# Patient Record
Sex: FEMALE | Race: WHITE | Employment: FULL TIME | URBAN - METROPOLITAN AREA
[De-identification: names, ages, dates, MRNs, and addresses within clinical notes are randomized per-mention and may not be internally consistent; named-entity substitution may affect disease eponyms.]

---

## 2018-03-06 ENCOUNTER — OFFICE VISIT (OUTPATIENT)
Dept: SURGERY | Facility: CLINIC | Age: 38
End: 2018-03-06
Payer: COMMERCIAL

## 2018-03-06 ENCOUNTER — HOSPITAL ENCOUNTER (OUTPATIENT)
Dept: MAMMOGRAPHY | Facility: CLINIC | Age: 38
Discharge: HOME | End: 2018-03-06
Attending: SURGERY | Admitting: SURGERY
Payer: COMMERCIAL

## 2018-03-06 VITALS
DIASTOLIC BLOOD PRESSURE: 72 MMHG | BODY MASS INDEX: 21.26 KG/M2 | HEART RATE: 75 BPM | SYSTOLIC BLOOD PRESSURE: 119 MMHG | HEIGHT: 63 IN | WEIGHT: 120 LBS

## 2018-03-06 DIAGNOSIS — N63.10 BREAST MASS, RIGHT: Primary | ICD-10-CM

## 2018-03-06 DIAGNOSIS — N63.10 BREAST MASS, RIGHT: ICD-10-CM

## 2018-03-06 PROCEDURE — 99205 OFFICE O/P NEW HI 60 MIN: CPT | Performed by: SURGERY

## 2018-03-06 PROCEDURE — 76642 ULTRASOUND BREAST LIMITED: CPT | Mod: RT

## 2018-03-06 NOTE — MR AVS SNAPSHOT
"              After Visit Summary   3/6/2018    Mitra Chan    MRN: [id number]           Patient Information     Date Of Birth          1980        Visit Information        Provider Department      3/6/2018 1:45 PM Farhana Hanna MD [facility]      Today's Diagnoses     Breast mass, right    -  1       Follow-ups after your visit        Future tests that were ordered for you today     Open Future Orders        Priority Expected Expires Ordered    US Breast Right Limited 1-3 Quadrants Routine 9/6/2018 3/6/2019 3/6/2018    US Breast Right Limited 1-3 Quadrants Routine 3/6/2018 3/6/2019 3/6/2018            Who to contact     If you have questions or need follow up information about today's clinic visit or your schedule please contact [facility] directly at [phone].  Normal or non-critical lab and imaging results will be communicated to you by MyChart, letter or phone within 4 business days after the clinic has received the results. If you do not hear from us within 7 days, please contact the clinic through [employer] or phone. If you have a critical or abnormal lab result, we will notify you by phone as soon as possible.  Submit refill requests through [employer] or call your pharmacy and they will forward the refill request to us. Please allow 3 business days for your refill to be completed.          Additional Information About Your Visit        MyChart Information     [employer] lets you send messages to your doctor, view your test results, renew your prescriptions, schedule appointments and more. To sign up, go to [email or web address] . Click on \"Log in\" on the left side of the screen, which will take you to the Welcome page. Then click on \"Sign up Now\" on the right side of the page.     You will be asked to enter the access code listed below, as well as some personal information. Please follow the " "directions to create your username and password.     Your access code is: [email or web address]  Expires: 2018  3:18 PM     Your access code will  in 90 days. If you need help or a new code, please call your [facility] clinic or [phone].        Care EveryWhere ID     This is your Care EveryWhere ID. This could be used by other organizations to access your [facility] medical records  [id number]        Your Vitals Were     Pulse Height BMI (Body Mass Index)             75 5' 3\" (1.6 m) 21.26 kg/m2          Blood Pressure from Last 3 Encounters:   18 119/72    Weight from Last 3 Encounters:   18 120 lb (54.4 kg)               Primary Care Provider Office Phone # Fax #    Klaus Reynolds [phone]       [id number]  [facility] [address]        Equal Access to Services     KISHA CASTANO AH: Hadii aad ku hadasho Soomaali, waaxda luqadaha, qaybta kaalmada adeegyada, waxay idiin hayaan ellieg josep fontenotn . So [facility] [phone].    ATENCIÓN: Si habla español, tiene a dennis disposición servicios gratuitos de asistencia lingüística. Llame al [phone].    We comply with applicable federal civil rights laws and [place] laws. We do not discriminate on the basis of race, color, national origin, age, disability, sex, sexual orientation, or gender identity.            Thank you!     Thank you for choosing [facility]  for your care. Our goal is always to provide you with excellent care. Hearing back from our patients is one way we can continue to improve our services. Please take a few minutes to complete the written survey that you may receive in the mail after your visit with us. Thank you!             Your Updated Medication List - Protect others around you: Learn how to safely use, store and throw away your medicines at www.disposemymeds.org.      Notice  As of 3/6/2018  3:18 PM    You have not been prescribed any medications.      "

## 2018-03-06 NOTE — PROGRESS NOTES
[facility] Breast Surgery Consultation    HPI:   Mitra Chan is a 37 year old female who is seen in consultation at the request of Dr. Reynolds for evaluation of evaluation of a lump in her right breast.     She was previously seen by Dr. Travis and has a history of excision of two left breast fibroadenomas in 2011. She also had excision of two fibroadenomas when she was 21yo from the left breast per chart review.     She reports she has felt a lump in her right breast for the past month. It is tender with palpation/pressure. She denies any other breast changes. No nipple drainage. No new masses on the left.  She has undergone 1 cycle of infertility treatment with estrogen use a few months ago.  She is scheduled for a another cycle of infertility treatment to be unlikely at the end of the month.  She currently is not on any oral contraceptives or hormone medications.  She reports she has had nipple inversion on the right breast since she was a teenager.    Her family history is notable for a paternal grandmother w colon cancer, lung cancer in maternal grandmother, prostate cancer in paternal grandfather.  Her mother was diagnosed with ovarian cancer 6 years ago, she had genetic testing and was negative for BRCA 1 and 2 per pt.     She reached menarche at 15 years old.  Her last menstrual period was began on March 1.  She has no children.  She has used oral contraceptives in the past.      Imaging:   none    Percutaneous core needle biopsy: none      Past Medical History:  Left breast fibroadenomas        Past Surgical History:  Excision of left breast fibroadenomas     Allergies: Compazine and sulfa      Social History:  Social History     Social History     Marital status:      Spouse name: N/A     Number of children: N/A     Years of education: N/A     Occupational History     Not on file.     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    ROS:  The 10 point review of systems is negative other than noted in the HPI and above.    PE:  Vitals: There were no vitals taken for this visit.  General appearance: well-nourished, sitting comfortably, no apparent distress  Psych: normal affect, pleasant  HEENT:  Head normocephalic and atraumatic, pupils equal and round, conjunctivae clear, mucous membranes moist, external ears and nose normal  Neck: Supple without thyromegaly or masses  Lungs: Respirations unlabored  Lymphatic: No cervical, or supraclavicular lymphadenopathy  Extremities: Without edema  Musculoskeletal:  Normal station and gait  Neurologic: nonfocal, grossly intact times four extremities, alert and oriented times three  Psychiatric: Mood and affect are appropriate  Skin: Without lesions or rashes    Breast:  A bilateral breast exam was performed in the supine position.. Bilateral breasts were palpated in a circumferential clockwise fashion including the supraclavicular and axillary areas.   Bilateral breasts are small in size.  There is a 1 cm palpable lesion at 8:00 2-3 cm from the nipple on the right breast which is mobile and clinically feels consistent with fibroadenoma.  She is tender to palpation over this lesion.  Remainder of the breast tissue is moderately dense.  She does have some mild nipple inversion on the right breast.  Left breast with periareolar incision which is well-healed at 12:00.  No palpable masses.  Parenchyma is moderately dense throughout.  Nipple and areolar complex are normal.    Lymph:       No supraclavicular/infraclavicular adenopathy.   Axillary adenopathy: none    Assessment: Mitra is a very pleasant 37-year-old female with a past medical history significant for ulcerative colitis status post total abdominal colectomy  who presents with a palpable right breast mass.  I recommend ultrasound for further evaluation and this will be completed today.  I will discuss plan going forward following her ultrasound.  She is leaning towards excision of the mass as she will be starting hormone replacement and is concerned about if she has issues with pain if she were to become pregnant and need excision at that time.     Plan:  Her US reveals a likely fibroadenoma with no concerning findings. Dr. Yao recommends 6month follow up if she does not proceed with excision. I discussed options with her regarding excision, including the risks, benefits, indications and alternatives vs follow up in 6 months. She is undecided regarding excision vs follow up. She would like to think about it and will let us know. I entered orders for a 6month f/u US and appt with me to follow - we will cancel this if she elects to proceed with excision. This is an easily palpable mass and would not require seed localization.         Farhana Hanna MD      Please route or send letter to:  Primary Care Provider (PCP) and Referring Provider

## 2018-03-06 NOTE — NURSING NOTE
Breast Patients    BREAST PATIENTS (ALL)    1-Do you have any of the following symptoms? Lump(s) or Mass(es)  2-In which breast are you having the symptoms? right  3-Do you use hormones?  Yes  Type: Estrogen    4-Have you had a Mammogram? No  5-Have you ever had a breast cyst drained? No  6-Have you ever had a breast biopsy? Yes  Side: Left  Date: 2000,2001,2011  7-Have you ever had a Breast Cancer? No   8-Is there a history of Breast Cancer in your family? No  9-Have you ever had Ovarian Cancer? No  10-Is there a history of Ovarian Cancer in your family? Yes   Relationship to you:    Mother  11-Summarize your caffeine intake (i.e. coffee, tea, chocolate, soda etc.): 2 per week    BREAST PATIENTS (FEMALE)    12-What age did your periods begin? 15  13-Date your last menstrual period began? 3/1/2018  14-Number of full-term pregnancies: 0  15-Your age when your first child was born? N/A  16-Did you nurse your children? N/A  17-Are you pregnant now? No  18-Have you begun menopause? No  19-Have you had either ovary removed?No  20-Do you have breast implants? No

## 2018-03-06 NOTE — LETTER
2018    Re: Mitra Chan - 1980    Mitra Chan is a 37 year old female who is seen in consultation at the request of Dr. Reynolds for evaluation of evaluation of a lump in her right breast.      She was previously seen by Dr. Travis and has a history of excision of two left breast fibroadenomas in . She also had excision of two fibroadenomas when she was 19yo from the left breast per chart review.      She reports she has felt a lump in her right breast for the past month. It is tender with palpation/pressure. She denies any other breast changes. No nipple drainage. No new masses on the left.  She has undergone 1 cycle of infertility treatment with estrogen use a few months ago.  She is scheduled for a another cycle of infertility treatment to be unlikely at the end of the month.  She currently is not on any oral contraceptives or hormone medications.  She reports she has had nipple inversion on the right breast since she was a teenager.     Her family history is notable for a paternal grandmother w colon cancer, lung cancer in maternal grandmother, prostate cancer in paternal grandfather.  Her mother was diagnosed with ovarian cancer 6 years ago, she had genetic testing and was negative for BRCA 1 and 2 per pt.      She reached menarche at 15 years old.  Her last menstrual period was began on .  She has no children. She has used oral contraceptives in the past.     Imaging:   none     Percutaneous core needle biopsy: none     Past Medical History:  Left breast fibroadenomas      Past Surgical History:  Excision of left breast fibroadenomas                     Allergies: Compazine and sulfa  ROS:  The 10 point review of systems is negative other than noted in the HPI and above.     PE:  Vitals: There were no vitals taken for this visit.  General appearance: well-nourished, sitting comfortably, no apparent distress  Psych: normal affect, pleasant  HEENT:  Head normocephalic and atraumatic,  pupils equal and round, conjunctivae clear, mucous membranes moist, external ears and nose normal  Neck: Supple without thyromegaly or masses  Lungs: Respirations unlabored  Lymphatic: No cervical, or supraclavicular lymphadenopathy  Extremities: Without edema  Musculoskeletal:  Normal station and gait  Neurologic: nonfocal, grossly intact times four extremities, alert and oriented times three  Psychiatric: Mood and affect are appropriate  Skin: Without lesions or rashes     Breast:  A bilateral breast exam was performed in the supine position.. Bilateral breasts were palpated in a circumferential clockwise fashion including the supraclavicular and axillary areas.   Bilateral breasts are small in size.  There is a 1 cm palpable lesion at 8:00 2-3 cm from the nipple on the right breast which is mobile and clinically feels consistent with fibroadenoma.  She is tender to palpation over this lesion.  Remainder of the breast tissue is moderately dense.  She does have some mild nipple inversion on the right breast.  Left breast with periareolar incision which is well-healed at 12:00.  No palpable masses.  Parenchyma is moderately dense throughout.  Nipple and areolar complex are normal.     Lymph:    No supraclavicular/infraclavicular adenopathy.                   Axillary adenopathy: none     Assessment: Mitra is a very pleasant 37-year-old female with a past medical history significant for ulcerative colitis status post total abdominal colectomy who presents with a palpable right breast mass.  I recommend ultrasound for further evaluation and this will be completed today.  I will discuss plan going forward following her ultrasound.  She is leaning towards excision of the mass as she will be starting hormone replacement and is concerned about if she has issues with pain if she were to become pregnant and need excision at that time.      Plan:  Her US reveals a likely fibroadenoma with no concerning findings. Dr. Yao  recommends 6month follow up if she does not proceed with excision. I discussed options with her regarding excision, including the risks, benefits, indications and alternatives vs follow up in 6 months. She is undecided regarding excision vs follow up. She would like to think about it and will let us know. I entered orders for a 6month f/u US and appt with me to follow - we will cancel this if she elects to proceed with excision. This is an easily palpable mass and would not require seed localization.        Farhana Hanna MD

## 2018-09-13 ENCOUNTER — OFFICE VISIT (OUTPATIENT)
Dept: SURGERY | Facility: CLINIC | Age: 38
End: 2018-09-13
Payer: COMMERCIAL

## 2018-09-13 ENCOUNTER — RADIANT APPOINTMENT (OUTPATIENT)
Dept: ULTRASOUND IMAGING | Facility: CLINIC | Age: 38
End: 2018-09-13
Attending: SURGERY
Payer: COMMERCIAL

## 2018-09-13 VITALS — DIASTOLIC BLOOD PRESSURE: 66 MMHG | SYSTOLIC BLOOD PRESSURE: 106 MMHG | HEART RATE: 68 BPM

## 2018-09-13 DIAGNOSIS — N63.10 BREAST MASS, RIGHT: Primary | ICD-10-CM

## 2018-09-13 DIAGNOSIS — N63.10 BREAST MASS, RIGHT: ICD-10-CM

## 2018-09-13 PROCEDURE — 99213 OFFICE O/P EST LOW 20 MIN: CPT | Performed by: SURGERY

## 2018-09-13 PROCEDURE — 76642 ULTRASOUND BREAST LIMITED: CPT | Mod: RT

## 2018-09-13 RX ORDER — PRENATAL VIT/IRON FUM/FOLIC AC 27MG-0.8MG
1 TABLET ORAL DAILY
COMMUNITY

## 2018-09-13 NOTE — PROGRESS NOTES
[facility] Follow Up Note    CHIEF COMPLAINT:  Right breast mass    HISTORY OF PRESENT ILLNESS:  Mitra Chan is a 38 year old female who is seen in follow up given her history of a right breast mass which I had seen her for in March 2018. She had an ultrasound at that time which revealed a likely fibroadenoma. We discussed options of 6 month follow up vs excisional biopsy and she elected to proceed with follow up. Today she reports she had her US this morning and the fibroadenoma is the same size at 1cm. She is continues to do IVF and is currently off estrogen. She had an embryo implanted recently that was unsuccessful. She has bilateral breast tenderness related to IVF. No nipple drainage or discharge. 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 Travis excised in 2011.     Review of FH and breast history:   Her family history is notable for a paternal grandmother w colon cancer, lung cancer in maternal grandmother, prostate cancer in paternal grandfather.  Her mother was diagnosed with ovarian cancer 6 years ago, she had genetic testing and was negative for BRCA 1 and 2 per pt.      She reached menarche at 15 years old.  Her last menstrual period was began on March 1.  She has no children.  She has used oral contraceptives in the past.    REVIEW OF SYSTEMS:  Constitutional:  Negative for chills, fatigue, fever and weight change.  Eyes:  Negative for blurred vision, eye pain and photophobia.  ENT:  Negative for hearing problems, ENT pain, congestion, rhinorrhea, epistaxis, hoarseness and dental problems.  Cardiovascular:  Negative for chest pain, palpitations, tachycardia, orthopnea and edema.  Respiratory:  Negative for cough, dyspnea and hemoptysis.  Gastrointestinal:  Negative for abdominal pain, heartburn, constipation, diarrhea and stool changes.  Musculoskeletal:  Negative for arthralgias, back pain and myalgias.  Integumentary/Breast:  See HPI.    PMH: h/o Ulcerative colitis s/p total  abdominal colectomy    Past Surgical History:   Procedure Laterality Date     ABDOMEN SURGERY  2015    three times     BREAST SURGERY  2000,2001,2011       Family History   Problem Relation Age of Onset     Other Cancer Mother      Colon Cancer Paternal Grandmother        Social History   Substance Use Topics     Smoking status: Former Smoker     Quit date: 3/6/2006     Smokeless tobacco: Never Used     Alcohol use Yes      Comment: 4-5 per week       Allergies   Allergen Reactions     Compazine [Prochlorperazine]      Sulfa Drugs          EXAM:  GENERAL: healthy, alert and no distress   BREAST:  The breasts appear symmetric with no overlying skin changes.  The nipples are normal bilaterally.  There is no dimpling or thickening of the skin.  There is a palpable mass on the right breast which feels to be 1 cm in size at 8:00, 4 cm FN.   The breast tissue is generally dense.  There is no axillary or supraclavicular lymphadenopathy.  CARDIOVASCULAR:  RRR  RESPIRATORY: nonlabored breathing  NECK: Neck supple. No adenopathy. Thyroid symmetric, normal size,, Carotids without bruits.  SKIN: No suspicious lesions or rashes  LYMPH: Normal cervical lymph nodes      ASSESSMENT:  Mitra Chan is a 38yof with a right breast mass which is likely a fibroadenoma based on imaging. We again discussed options of surgical excision vs follow up ultrasound in 6 months and continuing to follow the mass. At this time she would like to proceed with f/u US in March 2019 with appt with me to follow. If the mass is changing or more painful - she will call to schedule excision.     PLAN:  F/u right breast US in 6 months and clinical exam after imaging.     Farhana Hanna MD  [facility]  [phone]        Please route or send letter to:  Primary Care Provider (PCP) and Referring Provider

## 2018-09-13 NOTE — NURSING NOTE
Mitra Chan's goals for this visit include: 6 month recheck lump right breast   She requests these members of her care team be copied on today's visit information: no    PCP: Klaus Reynolds    Referring Provider:  Klaus Reynolds  [address]  [id number]  [place] [address]    There were no vitals taken for this visit.    Do you need any medication refills at today's visit? No    Zully Stallworth LPN

## 2018-09-13 NOTE — LETTER
2018    Re: Mitra Chan - 1980    CHIEF COMPLAINT:  Right breast mass     HISTORY OF PRESENT ILLNESS:  Mitra Chan is a 38 year old female who is seen in follow up given her history of a right breast mass which I had seen her for in 2018. She had an ultrasound at that time which revealed a likely fibroadenoma. We discussed options of 6 month follow up vs excisional biopsy and she elected to proceed with follow up. Today she reports she had her US this morning and the fibroadenoma is the same size at 1cm. She is continues to do IVF and is currently off estrogen. She had an embryo implanted recently that was unsuccessful. She has bilateral breast tenderness related to IVF. No nipple drainage or discharge. 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 Travis excised in .      Review of FH and breast history:   Her family history is notable for a paternal grandmother w colon cancer, lung cancer in maternal grandmother, prostate cancer in paternal grandfather.  Her mother was diagnosed with ovarian cancer 6 years ago, she had genetic testing and was negative for BRCA 1 and 2 per pt.       She reached menarche at 15 years old.  Her last menstrual period was began on .  She has no children. She has used oral contraceptives in the past.     REVIEW OF SYSTEMS:  Constitutional:  Negative for chills, fatigue, fever and weight change.  Eyes:  Negative for blurred vision, eye pain and photophobia.  ENT:  Negative for hearing problems, ENT pain, congestion, rhinorrhea, epistaxis, hoarseness and dental problems.  Cardiovascular:  Negative for chest pain, palpitations, tachycardia, orthopnea and edema.  Respiratory:  Negative for cough, dyspnea and hemoptysis.  Gastrointestinal:  Negative for abdominal pain, heartburn, constipation, diarrhea and stool changes.  Musculoskeletal:  Negative for arthralgias, back pain and myalgias.  Integumentary/Breast:  See HPI.     PMH: h/o Ulcerative  colitis s/p total abdominal colectomy      EXAM:  GENERAL: healthy, alert and no distress   BREAST:  The breasts appear symmetric with no overlying skin changes.  The nipples are normal bilaterally. There is no dimpling or thickening of the skin.  There is a palpable mass on the right breast which feels to be 1 cm in size at 8:00, 4 cm FN.   The breast tissue is generally dense.  There is no axillary or supraclavicular lymphadenopathy.  CARDIOVASCULAR:  RRR  RESPIRATORY: nonlabored breathing  NECK: Neck supple. No adenopathy. Thyroid symmetric, normal size,, Carotids without bruits.  SKIN: No suspicious lesions or rashes  LYMPH: Normal cervical lymph nodes      ASSESSMENT:  Mitra Chan is a 38yof with a right breast mass which is likely a fibroadenoma based on imaging. We again discussed options of surgical excision vs follow up ultrasound in 6 months and continuing to follow the mass. At this time she would like to proceed with f/u US in March 2019 with appt with me to follow. If the mass is changing or more painful - she will call to schedule excision.      PLAN:  F/u right breast US in 6 months and clinical exam after imaging.      Farhana Hanna MD  [facility]  [phone]

## 2018-09-13 NOTE — MR AVS SNAPSHOT
After Visit Summary   2018    Mitra Chan    MRN: [id number]           Patient Information     Date Of Birth          1980        Visit Information        Provider Department      2018 10:15 AM Farhana Hanna MD [facility]        Today's Diagnoses     Breast mass, right    -  1       Follow-ups after your visit        Future tests that were ordered for you today     Open Future Orders        Priority Expected Expires Ordered    US Breast Right Limited 1-3 Quadrants Routine 3/4/2019 2019 2018            Who to contact     If you have questions or need follow up information about today's clinic visit or your schedule please contact [facility] directly at [phone].  Normal or non-critical lab and imaging results will be communicated to you by MyChart, letter or phone within 4 business days after the clinic has received the results. If you do not hear from us within 7 days, please contact the clinic through MyChart or phone. If you have a critical or abnormal lab result, we will notify you by phone as soon as possible.  Submit refill requests through bMenu or call your pharmacy and they will forward the refill request to us. Please allow 3 business days for your refill to be completed.          Additional Information About Your Visit        MyChart Information     bMenu is an electronic gateway that provides easy, online access to your medical records. With bMenu, you can request a clinic appointment, read your test results, renew a prescription or communicate with your care team.     To sign up for bMenu visit the website at [email or web address]   You will be asked to enter the access code listed below, as well as some personal information. Please follow the directions to create your username and password.     Your access code is: [email or web address]  Expires: 2018 10:23 AM     Your access code will  in 90 days. If you  need help or a new code, please contact your [facility] or call [phone] for assistance.        Care EveryWhere ID     This is your Care EveryWhere ID. This could be used by other organizations to access your [place] medical records  [id number]        Your Vitals Were     Pulse                   68            Blood Pressure from Last 3 Encounters:   09/13/18 106/66   03/06/18 119/72    Weight from Last 3 Encounters:   03/06/18 120 lb (54.4 kg)               Primary Care Provider Office Phone # Fax #    Klaus [place] [phone]       [facility] [id number]  [facility] [address]        Equal Access to Services     KISHA CASTANO : Hadii rohan senior hadasho Soomaali, waaxda luqadaha, qaybta kaalmada adeegyada, bakari tierney. So [facility] [phone].    ATENCIÓN: Si habla español, tiene a dennis disposición servicios gratuitos de asistencia lingüística. Llame al [phone].    We comply with applicable federal civil rights laws and [place] laws. We do not discriminate on the basis of race, color, national origin, age, disability, sex, sexual orientation, or gender identity.            Thank you!     Thank you for choosing [facility]  for your care. Our goal is always to provide you with excellent care. Hearing back from our patients is one way we can continue to improve our services. Please take a few minutes to complete the written survey that you may receive in the mail after your visit with us. Thank you!             Your Updated Medication List - Protect others around you: Learn how to safely use, store and throw away your medicines at www.disposemymeds.org.          This list is accurate as of 9/13/18 10:23 AM.  Always use your most recent med list.                   Brand Name Dispense Instructions for use Diagnosis    prenatal multivitamin plus iron 27-0.8 MG Tabs per tablet      Take 1 tablet by  mouth daily

## 2018-09-13 NOTE — LETTER
9/13/2018         RE: Mitra Chan  [address] [address]        Dear Colleague,    Thank you for referring your patient, Mitra Chan, to the [facility]. Please see a copy of my visit note below.    [facility] Follow Up Note    CHIEF COMPLAINT:  Right breast mass    HISTORY OF PRESENT ILLNESS:  Mitra Chan is a 38 year old female who is seen in follow up given her history of a right breast mass which I had seen her for in March 2018. She had an ultrasound at that time which revealed a likely fibroadenoma. We discussed options of 6 month follow up vs excisional biopsy and she elected to proceed with follow up. Today she reports she had her US this morning and the fibroadenoma is the same size at 1cm. She is continues to do IVF and is currently off estrogen. She had an embryo implanted recently that was unsuccessful. She has bilateral breast tenderness related to IVF. No nipple drainage or discharge. 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 Travis excised in 2011.     Review of FH and breast history:   Her family history is notable for a paternal grandmother w colon cancer, lung cancer in maternal grandmother, prostate cancer in paternal grandfather.  Her mother was diagnosed with ovarian cancer 6 years ago, she had genetic testing and was negative for BRCA 1 and 2 per pt.      She reached menarche at 15 years old.  Her last menstrual period was began on March 1.  She has no children.  She has used oral contraceptives in the past.    REVIEW OF SYSTEMS:  Constitutional:  Negative for chills, fatigue, fever and weight change.  Eyes:  Negative for blurred vision, eye pain and photophobia.  ENT:  Negative for hearing problems, ENT pain, congestion, rhinorrhea, epistaxis, hoarseness and dental problems.  Cardiovascular:  Negative for chest pain, palpitations, tachycardia, orthopnea and edema.  Respiratory:  Negative for cough, dyspnea and  hemoptysis.  Gastrointestinal:  Negative for abdominal pain, heartburn, constipation, diarrhea and stool changes.  Musculoskeletal:  Negative for arthralgias, back pain and myalgias.  Integumentary/Breast:  See HPI.    PMH: h/o Ulcerative colitis s/p total abdominal colectomy    Past Surgical History:   Procedure Laterality Date     ABDOMEN SURGERY  2015    three times     BREAST SURGERY  2000,2001,2011       Family History   Problem Relation Age of Onset     Other Cancer Mother      Colon Cancer Paternal Grandmother        Social History   Substance Use Topics     Smoking status: Former Smoker     Quit date: 3/6/2006     Smokeless tobacco: Never Used     Alcohol use Yes      Comment: 4-5 per week       Allergies   Allergen Reactions     Compazine [Prochlorperazine]      Sulfa Drugs          EXAM:  GENERAL: healthy, alert and no distress   BREAST:  The breasts appear symmetric with no overlying skin changes.  The nipples are normal bilaterally.  There is no dimpling or thickening of the skin.  There is a palpable mass on the right breast which feels to be 1 cm in size at 8:00, 4 cm FN.   The breast tissue is generally dense.  There is no axillary or supraclavicular lymphadenopathy.  CARDIOVASCULAR:  RRR  RESPIRATORY: nonlabored breathing  NECK: Neck supple. No adenopathy. Thyroid symmetric, normal size,, Carotids without bruits.  SKIN: No suspicious lesions or rashes  LYMPH: Normal cervical lymph nodes      ASSESSMENT:  Mitra Chan is a 38yof with a right breast mass which is likely a fibroadenoma based on imaging. We again discussed options of surgical excision vs follow up ultrasound in 6 months and continuing to follow the mass. At this time she would like to proceed with f/u US in March 2019 with appt with me to follow. If the mass is changing or more painful - she will call to schedule excision.     PLAN:  F/u right breast US in 6 months and clinical exam after imaging.     Farhana Hanna MD  [facility]  [phone]        Please route or send letter to:  Primary Care Provider (PCP) and Referring Provider      Again, thank you for allowing me to participate in the care of your patient.        Sincerely,        Farhaan Hanna MD

## 2019-03-11 ENCOUNTER — TELEPHONE (OUTPATIENT)
Dept: SURGERY | Facility: CLINIC | Age: 39
End: 2019-03-11

## 2019-03-11 NOTE — TELEPHONE ENCOUNTER
Pt called, no answer. VM Id's pt. Left message for pt to call the [facility] back at [phone].    Patient is scheduled to see Dr. Hanna on 3/14/19 for follow up on a right breast mass. Per Dr. Hanna's office notes dated 9/13/18, patient should have breast imaging done before her exam. Writer left message with image scheduling number [phone].    ASSESSMENT:  Mitra Chan is a 38yof with a right breast mass which is likely a fibroadenoma based on imaging. We again discussed options of surgical excision vs follow up ultrasound in 6 months and continuing to follow the mass. At this time she would like to proceed with f/u US in March 2019 with appt with me to follow. If the mass is changing or more painful - she will call to schedule excision.      PLAN:  F/u right breast US in 6 months and clinical exam after imaging.

## 2019-03-13 NOTE — TELEPHONE ENCOUNTER
Pt called and notified of message below. Imaging phone number given to pt and pt states that she will call to schedule an US appt prior to her appt with . Pt advised to call the clinic back if she needs to reschedule her appt with ..Deepti Gonzalez RN      Pt returned the call and states that there are no US appts available prior to appt with  tomorrow and since she would like to schedule on the same day pt would like to reschedule appt. Pt states that she would prefer to schedule something the week of 4/15. Pt scheduled on 4/18/19 at 10am. Pt transferred to Imaging to schedule US....Deepti Gonzalez RN

## 2019-04-18 ENCOUNTER — OFFICE VISIT (OUTPATIENT)
Dept: SURGERY | Facility: CLINIC | Age: 39
End: 2019-04-18
Payer: COMMERCIAL

## 2019-04-18 ENCOUNTER — ANCILLARY PROCEDURE (OUTPATIENT)
Dept: ULTRASOUND IMAGING | Facility: CLINIC | Age: 39
End: 2019-04-18
Attending: SURGERY
Payer: COMMERCIAL

## 2019-04-18 DIAGNOSIS — N63.10 BREAST MASS, RIGHT: ICD-10-CM

## 2019-04-18 DIAGNOSIS — N63.10 BREAST MASS, RIGHT: Primary | ICD-10-CM

## 2019-04-18 PROCEDURE — 99213 OFFICE O/P EST LOW 20 MIN: CPT | Performed by: SURGERY

## 2019-04-18 PROCEDURE — 76642 ULTRASOUND BREAST LIMITED: CPT | Mod: RT | Performed by: RADIOLOGY

## 2019-04-18 NOTE — LETTER
4/18/2019         RE: Mitra Chan  [address] [address]        Dear Colleague,    Thank you for referring your patient, Mitra Chan, to the [facility]. Please see a copy of my visit note below.     [facility] Follow Up Note    CHIEF COMPLAINT:  Right breast mass, likely fibroadenoma    HISTORY OF PRESENT ILLNESS: Mitra Chan is a 38 year old female who is seen in follow up given her history of a right breast mass which I had seen her for in September 2018. She had an ultrasound at that time which revealed a likely fibroadenoma. We discussed options of 6 month follow up vs excisional biopsy and she elected to proceed with follow up. She is now 20 weeks pregnant and doing great. She is very excited as this was their last embryo.     She had an ultrasound this morning that revealed slight increase in size of the lesion at 1.4cm, previously 1.1cm and 1.0cm in March 2018. No pain or tenderness.       Review of FH and breast history:   Her family history is notable for a paternal grandmother w colon cancer, lung cancer in maternal grandmother, prostate cancer in paternal grandfather.  Her mother was diagnosed with ovarian cancer 6 years ago, she had genetic testing and was negative for BRCA 1 and 2 per pt.      She reached menarche at 15 years old.  Her last menstrual period was began on March 1.  She has no children.  She has used oral contraceptives in the past.      REVIEW OF SYSTEMS:  Constitutional:  Negative for chills, fatigue, fever and weight change.  Eyes:  Negative for blurred vision, eye pain and photophobia.  ENT:  Negative for hearing problems, ENT pain, congestion, rhinorrhea, epistaxis, hoarseness and dental problems.  Cardiovascular:  Negative for chest pain, palpitations, tachycardia, orthopnea and edema.  Respiratory:  Negative for cough, dyspnea and hemoptysis.  Gastrointestinal:  Negative for abdominal pain, heartburn, constipation, diarrhea and  stool changes.  Musculoskeletal:  Negative for arthralgias, back pain and myalgias.  Integumentary/Breast:  See HPI.    PMH: current pregnancy - 20 weeks    Past Surgical History:   Procedure Laterality Date     ABDOMEN SURGERY  2015    three times     BREAST SURGERY  ,,       Family History   Problem Relation Age of Onset     Other Cancer Mother      Colon Cancer Paternal Grandmother        Social History     Tobacco Use     Smoking status: Former Smoker     Last attempt to quit: 3/6/2006     Years since quittin.1     Smokeless tobacco: Never Used   Substance Use Topics     Alcohol use: Yes     Comment: 4-5 per week       There is no problem list on file for this patient.    Allergies   Allergen Reactions     Compazine [Prochlorperazine]      Sulfa Drugs      Current Outpatient Medications   Medication Sig Dispense Refill     Prenatal Vit-Fe Fumarate-FA (PRENATAL MULTIVITAMIN PLUS IRON) 27-0.8 MG TABS per tablet Take 1 tablet by mouth daily         EXAM:  GENERAL: healthy, alert and no distress   BREAST:  The breasts appear symmetric with no overlying skin changes.  The nipples are normal bilaterally.  There is no dimpling or thickening of the skin.  There is a small 1cm palpable nodule in the right breast at 8:00, 4cm FN which is mobile.   The breast tissue is generally dense, left breast is larger than right.  There is no axillary or supraclavicular lymphadenopathy.  CARDIOVASCULAR:  RRR  RESPIRATORY: nonlabored breathing  NECK: Neck supple. No adenopathy. Thyroid symmetric, normal size,, Carotids without bruits.  SKIN: No suspicious lesions or rashes  LYMPH: Normal cervical lymph nodes      ASSESSMENT/PLAN:  Mitra Chan is a 39yof with a right breast likely fibroadenoma which has increased slightly on her ultrasound. We discussed options of biopsy to confirm vs ongoing observation. As she is currently pregnant I am not surprised that the fibroadenoma has enlarged slightly. I think it would be  fine to continue to monitor. She is very good as self breast exam and if it is noticibly changing - she will pursue biopsy; otherwise, she will have an follow up ultrasound 6-8 weeks after delivery. She plans to have the mass excised after breast feeding.       Farhana Hanna MD  [facility]  [phone]        Please route or send letter to:  Primary Care Provider (PCP) and Referring Provider        Again, thank you for allowing me to participate in the care of your patient.        Sincerely,        Farhana Hanna MD

## 2019-04-18 NOTE — PROGRESS NOTES
[facility] Follow Up Note    CHIEF COMPLAINT:  Right breast mass, likely fibroadenoma    HISTORY OF PRESENT ILLNESS: Mitra Chan is a 38 year old female who is seen in follow up given her history of a right breast mass which I had seen her for in September 2018. She had an ultrasound at that time which revealed a likely fibroadenoma. We discussed options of 6 month follow up vs excisional biopsy and she elected to proceed with follow up. She is now 20 weeks pregnant and doing great. She is very excited as this was their last embryo.     She had an ultrasound this morning that revealed slight increase in size of the lesion at 1.4cm, previously 1.1cm and 1.0cm in March 2018. No pain or tenderness.       Review of FH and breast history:   Her family history is notable for a paternal grandmother w colon cancer, lung cancer in maternal grandmother, prostate cancer in paternal grandfather.  Her mother was diagnosed with ovarian cancer 6 years ago, she had genetic testing and was negative for BRCA 1 and 2 per pt.      She reached menarche at 15 years old.  Her last menstrual period was began on March 1.  She has no children.  She has used oral contraceptives in the past.      REVIEW OF SYSTEMS:  Constitutional:  Negative for chills, fatigue, fever and weight change.  Eyes:  Negative for blurred vision, eye pain and photophobia.  ENT:  Negative for hearing problems, ENT pain, congestion, rhinorrhea, epistaxis, hoarseness and dental problems.  Cardiovascular:  Negative for chest pain, palpitations, tachycardia, orthopnea and edema.  Respiratory:  Negative for cough, dyspnea and hemoptysis.  Gastrointestinal:  Negative for abdominal pain, heartburn, constipation, diarrhea and stool changes.  Musculoskeletal:  Negative for arthralgias, back pain and myalgias.  Integumentary/Breast:  See HPI.    PMH: current pregnancy - 20 weeks    Past Surgical History:   Procedure Laterality Date     ABDOMEN SURGERY  2015     three times     BREAST SURGERY  ,,       Family History   Problem Relation Age of Onset     Other Cancer Mother      Colon Cancer Paternal Grandmother        Social History     Tobacco Use     Smoking status: Former Smoker     Last attempt to quit: 3/6/2006     Years since quittin.1     Smokeless tobacco: Never Used   Substance Use Topics     Alcohol use: Yes     Comment: 4-5 per week       There is no problem list on file for this patient.    Allergies   Allergen Reactions     Compazine [Prochlorperazine]      Sulfa Drugs      Current Outpatient Medications   Medication Sig Dispense Refill     Prenatal Vit-Fe Fumarate-FA (PRENATAL MULTIVITAMIN PLUS IRON) 27-0.8 MG TABS per tablet Take 1 tablet by mouth daily         EXAM:  GENERAL: healthy, alert and no distress   BREAST:  The breasts appear symmetric with no overlying skin changes.  The nipples are normal bilaterally.  There is no dimpling or thickening of the skin.  There is a small 1cm palpable nodule in the right breast at 8:00, 4cm FN which is mobile.   The breast tissue is generally dense, left breast is larger than right.  There is no axillary or supraclavicular lymphadenopathy.  CARDIOVASCULAR:  RRR  RESPIRATORY: nonlabored breathing  NECK: Neck supple. No adenopathy. Thyroid symmetric, normal size,, Carotids without bruits.  SKIN: No suspicious lesions or rashes  LYMPH: Normal cervical lymph nodes      ASSESSMENT/PLAN:  Mitra Chan is a 39yof with a right breast likely fibroadenoma which has increased slightly on her ultrasound. We discussed options of biopsy to confirm vs ongoing observation. As she is currently pregnant I am not surprised that the fibroadenoma has enlarged slightly. I think it would be fine to continue to monitor. She is very good as self breast exam and if it is noticibly changing - she will pursue biopsy; otherwise, she will have an follow up ultrasound 6-8 weeks after delivery. She plans to have the mass excised after  breast feeding.       Farhana Hanna MD  [facility]  [phone]        Please route or send letter to:  Primary Care Provider (PCP) and Referring Provider

## 2019-04-18 NOTE — NURSING NOTE
Mitra Chan's goals for this visit include: 6 month recheck  She requests these members of her care team be copied on today's visit information: no    PCP: Klaus [place]    Referring Provider:  Klaus [place]  [address]  [id number]  [place] [address]    There were no vitals taken for this visit.    Do you need any medication refills at today's visit? No    Zully Stallworth LPN

## 2020-10-14 ENCOUNTER — TELEPHONE (OUTPATIENT)
Dept: SURGERY | Facility: CLINIC | Age: 40
End: 2020-10-14

## 2020-10-14 DIAGNOSIS — N63.10 BREAST MASS, RIGHT: Primary | ICD-10-CM

## 2020-10-14 NOTE — TELEPHONE ENCOUNTER
Left message for Mitra. Dr. Hanna would like right breast ultrasound repeated 6-8 weeks after completion of breastfeeding. Orders entered. Scheduling number provided. Encouraged call back with questions or concerns.    Alicja Caputo RN, BSN, OCN  Oncology Care Coordinator  [facility]  [facility]  Phone: [phone]

## 2020-10-14 NOTE — TELEPHONE ENCOUNTER
Name of caller: Patient    Reason for Call:  Patient request.  Patient is scheduled for a check up for Right breast mass with Dr. Hanna  10/22 at . She is wondering if she needs to have an Ultra Sound prior to that appointment.     Please call to advise.    Surgeon:  Dr. Hanna    Recent Surgery:  No    If yes, when & what type:  N/A      Best phone number to reach pt at is: [phone]    Ok to leave a message with medical info? Yes.

## 2020-10-22 ENCOUNTER — ANCILLARY PROCEDURE (OUTPATIENT)
Dept: ULTRASOUND IMAGING | Facility: CLINIC | Age: 40
End: 2020-10-22
Attending: SURGERY
Payer: COMMERCIAL

## 2020-10-22 ENCOUNTER — OFFICE VISIT (OUTPATIENT)
Dept: SURGERY | Facility: CLINIC | Age: 40
End: 2020-10-22
Payer: COMMERCIAL

## 2020-10-22 DIAGNOSIS — N63.10 BREAST MASS, RIGHT: Primary | ICD-10-CM

## 2020-10-22 DIAGNOSIS — N63.10 BREAST MASS, RIGHT: ICD-10-CM

## 2020-10-22 PROCEDURE — 99213 OFFICE O/P EST LOW 20 MIN: CPT | Performed by: SURGERY

## 2020-10-22 PROCEDURE — 76642 ULTRASOUND BREAST LIMITED: CPT | Mod: RT

## 2020-10-22 NOTE — NURSING NOTE
Mitra Chan's goals for this visit include:   Chief Complaint   Patient presents with     RECHECK     right breast lump       She requests these members of her care team be copied on today's visit information: no    PCP: Klaus [place]    Referring Provider:  Klaus [place]  [address]  [address]  [place] [address]    There were no vitals taken for this visit.    Do you need any medication refills at today's visit? No    Zully Stallworth LPN

## 2020-10-22 NOTE — PROGRESS NOTES
[facility] Follow Up Note    CHIEF COMPLAINT:  Right breast mass    HISTORY OF PRESENT ILLNESS:  Mitra Chan is a 40 year old female who is seen in follow up for a likely right breast fibroadenoma.     I had seen her last on 4/18/2019 when she was 20 weeks pregnant. She has since delivered a healthy baby girl, Mel who is 18months old now. She reports the lump in her right breast feels smaller to her. It is not tender. She has not had this biopsied. She was able to breastfeed without issues. They plan to try a 2nd cycle of IVF/implant embryo this month.    She had an US on today which revealed the lesion is smaller at 9mm down from 1.2cm.     She has had a prior excision of two fibroadenomas in the left breast by Dr. Travis in 2011.     Review of FH and breast history:   Her family history is notable for a paternal grandmother w colon cancer, lung cancer in maternal grandmother, prostate cancer in paternal grandfather.  Her mother was diagnosed with ovarian cancer 6 years ago, she had genetic testing and was negative for BRCA 1 and 2 per pt.      She reached menarche at 15 years old.  Her last menstrual period was began on March 1.  She has no children.  She has used oral contraceptives in the past.    REVIEW OF SYSTEMS:  Constitutional:  Negative for chills, fatigue, fever and weight change.  Eyes:  Negative for blurred vision, eye pain and photophobia.  ENT:  Negative for hearing problems, ENT pain, congestion, rhinorrhea, epistaxis, hoarseness and dental problems.  Cardiovascular:  Negative for chest pain, palpitations, tachycardia, orthopnea and edema.  Respiratory:  Negative for cough, dyspnea and hemoptysis.  Gastrointestinal:  Negative for abdominal pain, heartburn, constipation, diarrhea and stool changes.  Musculoskeletal:  Negative for arthralgias, back pain and myalgias.  Integumentary/Breast:  See HPI.    No past medical history on file.    Past Surgical History:   Procedure  Laterality Date     ABDOMEN SURGERY  2015    three times     BREAST SURGERY  ,,       Family History   Problem Relation Age of Onset     Other Cancer Mother      Colon Cancer Paternal Grandmother        Social History     Tobacco Use     Smoking status: Former Smoker     Quit date: 3/6/2006     Years since quittin.6     Smokeless tobacco: Never Used   Substance Use Topics     Alcohol use: Yes     Comment: 4-5 per week       There is no problem list on file for this patient.    Allergies   Allergen Reactions     Compazine [Prochlorperazine]      Sulfa Drugs      Current Outpatient Medications   Medication Sig Dispense Refill     Prenatal Vit-Fe Fumarate-FA (PRENATAL MULTIVITAMIN PLUS IRON) 27-0.8 MG TABS per tablet Take 1 tablet by mouth daily       EXAM:  GENERAL: healthy, alert and no distress   BREAST:  The breasts appear symmetric with no overlying skin changes.  The nipples are normal bilaterally.  There is no dimpling or thickening of the skin.  At 8:00 along the areolar edge is a well circumscribed 8mm mass consistent with imaging.  The breast tissue is generally dense throughout.  There is no axillary or supraclavicular lymphadenopathy.  CARDIOVASCULAR:  RRR  RESPIRATORY: nonlabored breathing  NECK: Neck supple. No adenopathy. Thyroid symmetric, normal size,, Carotids without bruits.  SKIN: No suspicious lesions or rashes  LYMPH: Normal cervical lymph nodes      ASSESSMENT/PLAN:  Mitra Chan is a 40yof with a likely fibroadenoma on her right breast at 8:00. This has not been biopsied but followed closely with exam and imaging. It is decreasing in size on US today and is asymptomatic. We again discussed options. It appears benign on imaging and as such follow up with imaging is reasonable. Would recommend a follow up US after next pregnancy and completion of lactation and then she should start annual mammograms at 41yo. She will call if it is changing in the interim.           Farhana Hanna,  MD  [facility]  [phone]        Please route or send letter to:  Primary Care Provider (PCP) and Referring Provider

## 2020-10-22 NOTE — LETTER
10/22/2020         RE: Mitra Chan  [address] [facility]– [place] [address]        Dear Colleague,    Thank you for referring your patient, Mitra Chan, to the [facility] [place]. Please see a copy of my visit note below.    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     I had seen her last on 4/18/2019 when she was 20 weeks pregnant. She has since delivered a healthy baby girl, Mel who is 18months old now. She reports the lump in her right breast feels smaller to her. It is not tender. She has not had this biopsied. She was able to breastfeed without issues. They plan to try a 2nd cycle of IVF/implant embryo this month.    She had an US on today which revealed the lesion is smaller at 9mm down from 1.2cm.     She has had a prior excision of two fibroadenomas in the left breast by Dr. Travis in 2011.     Review of FH and breast history:   Her family history is notable for a paternal grandmother w colon cancer, lung cancer in maternal grandmother, prostate cancer in paternal grandfather.  Her mother was diagnosed with ovarian cancer 6 years ago, she had genetic testing and was negative for BRCA 1 and 2 per pt.      She reached menarche at 15 years old.  Her last menstrual period was began on March 1.  She has no children.  She has used oral contraceptives in the past.    REVIEW OF SYSTEMS:  Constitutional:  Negative for chills, fatigue, fever and weight change.  Eyes:  Negative for blurred vision, eye pain and photophobia.  ENT:  Negative for hearing problems, ENT pain, congestion, rhinorrhea, epistaxis, hoarseness and dental problems.  Cardiovascular:  Negative for chest pain, palpitations, tachycardia, orthopnea and edema.  Respiratory:  Negative for cough, dyspnea and hemoptysis.  Gastrointestinal:  Negative for abdominal  pain, heartburn, constipation, diarrhea and stool changes.  Musculoskeletal:  Negative for arthralgias, back pain and myalgias.  Integumentary/Breast:  See HPI.    No past medical history on file.    Past Surgical History:   Procedure Laterality Date     ABDOMEN SURGERY  2015    three times     BREAST SURGERY  ,,       Family History   Problem Relation Age of Onset     Other Cancer Mother      Colon Cancer Paternal Grandmother        Social History     Tobacco Use     Smoking status: Former Smoker     Quit date: 3/6/2006     Years since quittin.6     Smokeless tobacco: Never Used   Substance Use Topics     Alcohol use: Yes     Comment: 4-5 per week       There is no problem list on file for this patient.    Allergies   Allergen Reactions     Compazine [Prochlorperazine]      Sulfa Drugs      Current Outpatient Medications   Medication Sig Dispense Refill     Prenatal Vit-Fe Fumarate-FA (PRENATAL MULTIVITAMIN PLUS IRON) 27-0.8 MG TABS per tablet Take 1 tablet by mouth daily       EXAM:  GENERAL: healthy, alert and no distress   BREAST:  The breasts appear symmetric with no overlying skin changes.  The nipples are normal bilaterally.  There is no dimpling or thickening of the skin.  At 8:00 along the areolar edge is a well circumscribed 8mm mass consistent with imaging.  The breast tissue is generally dense throughout.  There is no axillary or supraclavicular lymphadenopathy.  CARDIOVASCULAR:  RRR  RESPIRATORY: nonlabored breathing  NECK: Neck supple. No adenopathy. Thyroid symmetric, normal size,, Carotids without bruits.  SKIN: No suspicious lesions or rashes  LYMPH: Normal cervical lymph nodes      ASSESSMENT/PLAN:  Mitra Chan is a 40yof with a likely fibroadenoma on her right breast at 8:00. This has not been biopsied but followed closely with exam and imaging. It is decreasing in size on US today and is asymptomatic. We again discussed options. It appears benign on imaging and as such follow up  with imaging is reasonable. Would recommend a follow up US after next pregnancy and completion of lactation and then she should start annual mammograms at 41yo. She will call if it is changing in the interim.           Farhana Hanna MD  [facility]  [phone]        Please route or send letter to:  Primary Care Provider (PCP) and Referring Provider          Again, thank you for allowing me to participate in the care of your patient.        Sincerely,        Farhana Hanna MD

## 2021-01-15 ENCOUNTER — HEALTH MAINTENANCE LETTER (OUTPATIENT)
Age: 41
End: 2021-01-15

## 2021-10-09 ENCOUNTER — HEALTH MAINTENANCE LETTER (OUTPATIENT)
Age: 41
End: 2021-10-09

## 2022-01-29 ENCOUNTER — HEALTH MAINTENANCE LETTER (OUTPATIENT)
Age: 42
End: 2022-01-29

## 2022-09-17 ENCOUNTER — HEALTH MAINTENANCE LETTER (OUTPATIENT)
Age: 42
End: 2022-09-17

## 2023-05-06 ENCOUNTER — HEALTH MAINTENANCE LETTER (OUTPATIENT)
Age: 43
End: 2023-05-06

## 2023-12-16 ENCOUNTER — HEALTH MAINTENANCE LETTER (OUTPATIENT)
Age: 43
End: 2023-12-16

## 2024-02-24 ENCOUNTER — HEALTH MAINTENANCE LETTER (OUTPATIENT)
Age: 44
End: 2024-02-24